# Patient Record
Sex: MALE | Employment: FULL TIME | URBAN - METROPOLITAN AREA
[De-identification: names, ages, dates, MRNs, and addresses within clinical notes are randomized per-mention and may not be internally consistent; named-entity substitution may affect disease eponyms.]

---

## 2024-01-14 NOTE — PROGRESS NOTES
HISTORY OF PRESENT ILLNESS  Jamar Truong Jr. is a 28 y.o. male.    PMH  ----  CARDIAC STUDIES  ----  2d echo pending  ----    Have you had Fatigue?  Yes    How long: Years   How bad: significantly fatigued     2.   Have you had have you had Chest Pain? No      3.   Have you had Dyspnea (SOB)? Yes    How long: Years    can you walk 2 blocks or a flight of stairs without SOB? Yes     4.   Have you had Orthopnea? No       5.   Have you had PND? No      6.   Have you had leg swelling? No         7.    Have you had any weight gain? No     8.    Have you had any palpitations? No      9.    Have you had any syncope? Yes    How long: Years How frequent: weekly How bad:severe with activity     10. Do you have any wounds on legs? No     Patient states that usually with activity then       No family history on file.    No past medical history on file.    No past surgical history on file.    Social History     Tobacco Use    Smoking status: Not on file    Smokeless tobacco: Not on file   Substance Use Topics    Alcohol use: Not on file       Not on File    Prior to Admission medications    Not on File       No results found for: \"LIPIDPAN\", \"BMP\", \"CMP\"     There were no vitals taken for this visit.    HPI    Review of Systems   Constitutional:  Negative for activity change, appetite change, diaphoresis, fatigue and unexpected weight change.   Eyes:  Negative for visual disturbance.   Respiratory:  Negative for apnea, cough, chest tightness, shortness of breath and wheezing.    Cardiovascular:  Negative for chest pain, palpitations and leg swelling.   Gastrointestinal:  Negative for abdominal pain, blood in stool, constipation, diarrhea and nausea.   Endocrine: Negative for cold intolerance and heat intolerance.   Genitourinary:  Negative for decreased urine volume and difficulty urinating.   Musculoskeletal:  Negative for gait problem, myalgias and neck pain.   Skin:  Negative for color change, pallor and rash.

## 2024-01-17 ENCOUNTER — OFFICE VISIT (OUTPATIENT)
Age: 29
End: 2024-01-17
Payer: OTHER GOVERNMENT

## 2024-01-17 VITALS
BODY MASS INDEX: 32.13 KG/M2 | OXYGEN SATURATION: 98 % | WEIGHT: 242.4 LBS | DIASTOLIC BLOOD PRESSURE: 70 MMHG | HEIGHT: 73 IN | HEART RATE: 60 BPM | SYSTOLIC BLOOD PRESSURE: 113 MMHG

## 2024-01-17 DIAGNOSIS — R00.1 BRADYCARDIA: Primary | ICD-10-CM

## 2024-01-17 DIAGNOSIS — R55 PRE-SYNCOPE: ICD-10-CM

## 2024-01-17 PROCEDURE — 99204 OFFICE O/P NEW MOD 45 MIN: CPT | Performed by: INTERNAL MEDICINE

## 2024-01-17 PROCEDURE — 93000 ELECTROCARDIOGRAM COMPLETE: CPT | Performed by: INTERNAL MEDICINE

## 2024-01-17 ASSESSMENT — ENCOUNTER SYMPTOMS
APNEA: 0
COLOR CHANGE: 0
DIARRHEA: 0
CHEST TIGHTNESS: 0
BLOOD IN STOOL: 0
NAUSEA: 0
SHORTNESS OF BREATH: 0
ABDOMINAL PAIN: 0
CONSTIPATION: 0
COUGH: 0
WHEEZING: 0

## 2024-01-17 NOTE — PATIENT INSTRUCTIONS
Learning About the Mediterranean Diet  What is the Mediterranean diet?     The Mediterranean diet is a style of eating rather than a diet plan. It features foods eaten in [place], [place], [place], and other countries along the [place]. It emphasizes eating foods like fish, fruits, vegetables, beans, high-fiber breads and whole grains, nuts, and olive oil. This style of eating includes limited red meat, cheese, and sweets.  Why choose the Mediterranean diet?  A Mediterranean-style diet may improve heart health. It contains more fat than other heart-healthy diets. But the fats are mainly from nuts, unsaturated oils (such as fish oils and olive oil), and certain nut or seed oils (such as canola, soybean, or flaxseed oil). These fats may help protect the heart and blood vessels.  How can you get started on the Mediterranean diet?  Here are some things you can do to switch to a more Mediterranean way of eating.  What to eat  Eat a variety of fruits and vegetables each day, such as grapes, blueberries, tomatoes, broccoli, peppers, figs, olives, spinach, eggplant, beans, lentils, and chickpeas.  Eat a variety of whole-grain foods each day, such as oats, brown rice, and whole wheat bread, pasta, and couscous.  Eat fish at least 2 times a week. Try tuna, salmon, mackerel, lake trout, herring, or sardines.  Eat moderate amounts of low-fat dairy products, such as milk, cheese, or yogurt.  Eat moderate amounts of poultry and eggs.  Choose healthy (unsaturated) fats, such as nuts, olive oil, and certain nut or seed oils like canola, soybean, and flaxseed.  Limit unhealthy (saturated) fats, such as butter, palm oil, and coconut oil. And limit fats found in animal products, such as meat and dairy products made with whole milk. Try to eat red meat only a few times a month in very small amounts.  Limit sweets and desserts to only a few times a week. This includes sugar-sweetened drinks like soda.  The

## 2024-01-17 NOTE — PROGRESS NOTES
Have you had Fatigue?  Yes    How long: Years   How bad: significantly fatigued    2.   Have you had have you had Chest Pain? No     3.   Have you had Dyspnea (SOB)? Yes     How long: Years    can you walk 2 blocks or a flight of stairs without SOB? Yes    4.   Have you had Orthopnea? No      5.   Have you had PND? No     6.   Have you had leg swelling? No     7.    Have you had any weight gain? No    8.    Have you had any palpitations? No     9.    Have you had any syncope? Yes     How long: Years How frequent: weekly How bad:severe with activity    10. Do you have any wounds on legs? No

## 2024-05-08 ASSESSMENT — ENCOUNTER SYMPTOMS
BLOOD IN STOOL: 0
DIARRHEA: 0
COUGH: 0
CONSTIPATION: 0
WHEEZING: 0
APNEA: 0
COLOR CHANGE: 0
ABDOMINAL PAIN: 0
SHORTNESS OF BREATH: 0
NAUSEA: 0
CHEST TIGHTNESS: 0

## 2024-05-08 NOTE — PROGRESS NOTES
HISTORY OF PRESENT ILLNESS  Jamar Truong Jr. is a 28 y.o. male.    PMH  ----  CARDIAC STUDIES  ----  Cardiac event monitor 2/20/2024  2 events were transmitted. 0 patient triggered; 2 auto triggered occurred with normal sinus rhythm  Patient monitored for 5m  PACs were not found during the monitoring period  PVCs were not found during the monitoring period  ----  2d echo pending  ----         Have you had Fatigue?  No      2.   Have you had have you had Chest Pain? No      3.   Have you had Dyspnea (SOB) ? Yes: with daily activities   4.   Have you had Orthopnea? No     5.   Have you had PND? No   6.   Have you had leg swelling? No   7.    Have you had any weight gain? No      8. Have you had any palpitations? No     9. Have you had any syncope? No   10. Do you have any wounds on legs?no          Patient states that with activity then can feel lightheaded and states that did not fully lose consciousness during these episodes, feels eyes drooping, etc.      No family history on file.    No past medical history on file.    No past surgical history on file.    Social History     Tobacco Use    Smoking status: Never    Smokeless tobacco: Never   Substance Use Topics    Alcohol use: Yes     Comment: seldomly       No Known Allergies    Prior to Admission medications    Not on File       No results found for: \"LIPIDPAN\", \"BMP\", \"CMP\"     There were no vitals taken for this visit.    HPI    Review of Systems   Constitutional:  Negative for activity change, appetite change, diaphoresis, fatigue and unexpected weight change.   Eyes:  Negative for visual disturbance.   Respiratory:  Negative for apnea, cough, chest tightness, shortness of breath and wheezing.    Cardiovascular:  Negative for chest pain, palpitations and leg swelling.   Gastrointestinal:  Negative for abdominal pain, blood in stool, constipation, diarrhea and nausea.   Endocrine: Negative for cold intolerance and heat intolerance.   Genitourinary:  Negative

## 2024-05-10 ENCOUNTER — OFFICE VISIT (OUTPATIENT)
Age: 29
End: 2024-05-10
Payer: OTHER GOVERNMENT

## 2024-05-10 VITALS
HEIGHT: 73 IN | BODY MASS INDEX: 33.8 KG/M2 | WEIGHT: 255 LBS | OXYGEN SATURATION: 98 % | HEART RATE: 50 BPM | DIASTOLIC BLOOD PRESSURE: 69 MMHG | SYSTOLIC BLOOD PRESSURE: 118 MMHG

## 2024-05-10 DIAGNOSIS — R55 PRE-SYNCOPE: Primary | ICD-10-CM

## 2024-05-10 PROCEDURE — 99214 OFFICE O/P EST MOD 30 MIN: CPT | Performed by: INTERNAL MEDICINE

## 2024-05-10 NOTE — PROGRESS NOTES
Have you had Fatigue?  No     2.   Have you had have you had Chest Pain? No     3.   Have you had Dyspnea (SOB) ? Yes: with daily activities   4.   Have you had Orthopnea? No    5.   Have you had PND? No   6.   Have you had leg swelling? No   7.    Have you had any weight gain? No     8. Have you had any palpitations? No     9. Have you had any syncope? No   10. Do you have any wounds on legs?no

## 2024-05-10 NOTE — PATIENT INSTRUCTIONS
Learning About the Mediterranean Diet  What is the Mediterranean diet?     The Mediterranean diet is a style of eating rather than a diet plan. It features foods eaten in [place], [place], [place], and other countries along the [place] Sea. It emphasizes eating foods like fish, fruits, vegetables, beans, high-fiber breads and whole grains, nuts, and olive oil. This style of eating includes limited red meat, cheese, and sweets.  Why choose the Mediterranean diet?  A Mediterranean-style diet may improve heart health. It contains more fat than other heart-healthy diets. But the fats are mainly from nuts, unsaturated oils (such as fish oils and olive oil), and certain nut or seed oils (such as canola, soybean, or flaxseed oil). These fats may help protect the heart and blood vessels.  How can you get started on the Mediterranean diet?  Here are some things you can do to switch to a more Mediterranean way of eating.  What to eat  Eat a variety of fruits and vegetables each day, such as grapes, blueberries, tomatoes, broccoli, peppers, figs, olives, spinach, eggplant, beans, lentils, and chickpeas.  Eat a variety of whole-grain foods each day, such as oats, brown rice, and whole wheat bread, pasta, and couscous.  Eat fish at least 2 times a week. Try tuna, salmon, mackerel, lake trout, herring, or sardines.  Eat moderate amounts of low-fat dairy products, such as milk, cheese, or yogurt.  Eat moderate amounts of poultry and eggs.  Choose healthy (unsaturated) fats, such as nuts, olive oil, and certain nut or seed oils like canola, soybean, and flaxseed.  Limit unhealthy (saturated) fats, such as butter, palm oil, and coconut oil. And limit fats found in animal products, such as meat and dairy products made with whole milk. Try to eat red meat only a few times a month in very small amounts.  Limit sweets and desserts to only a few times a week. This includes sugar-sweetened drinks like soda.  The

## 2024-10-02 ENCOUNTER — TELEPHONE (OUTPATIENT)
Age: 29
End: 2024-10-02

## 2024-10-02 NOTE — TELEPHONE ENCOUNTER
Patient can't make 10/23/24 appt to get echo results. Can we give results over phone and when does he need to follow up with you ?